# Patient Record
Sex: FEMALE | Race: WHITE | Employment: FULL TIME | URBAN - METROPOLITAN AREA
[De-identification: names, ages, dates, MRNs, and addresses within clinical notes are randomized per-mention and may not be internally consistent; named-entity substitution may affect disease eponyms.]

---

## 2021-07-27 ENCOUNTER — OFFICE VISIT (OUTPATIENT)
Dept: INTERNAL MEDICINE CLINIC | Facility: CLINIC | Age: 26
End: 2021-07-27
Payer: COMMERCIAL

## 2021-07-27 VITALS
TEMPERATURE: 99 F | HEART RATE: 60 BPM | HEIGHT: 61.8 IN | DIASTOLIC BLOOD PRESSURE: 66 MMHG | WEIGHT: 128 LBS | RESPIRATION RATE: 16 BRPM | OXYGEN SATURATION: 99 % | SYSTOLIC BLOOD PRESSURE: 122 MMHG | BODY MASS INDEX: 23.55 KG/M2

## 2021-07-27 DIAGNOSIS — Z01.419 ENCOUNTER FOR GYNECOLOGICAL EXAMINATION (GENERAL) (ROUTINE) WITHOUT ABNORMAL FINDINGS: ICD-10-CM

## 2021-07-27 DIAGNOSIS — Z00.00 ANNUAL PHYSICAL EXAM: Primary | ICD-10-CM

## 2021-07-27 DIAGNOSIS — Z00.00 LABORATORY EXAMINATION ORDERED AS PART OF A ROUTINE GENERAL MEDICAL EXAMINATION: ICD-10-CM

## 2021-07-27 PROCEDURE — 3078F DIAST BP <80 MM HG: CPT | Performed by: NURSE PRACTITIONER

## 2021-07-27 PROCEDURE — 99385 PREV VISIT NEW AGE 18-39: CPT | Performed by: NURSE PRACTITIONER

## 2021-07-27 PROCEDURE — 3008F BODY MASS INDEX DOCD: CPT | Performed by: NURSE PRACTITIONER

## 2021-07-27 PROCEDURE — 3074F SYST BP LT 130 MM HG: CPT | Performed by: NURSE PRACTITIONER

## 2021-07-27 RX ORDER — ALBUTEROL SULFATE 90 UG/1
2 AEROSOL, METERED RESPIRATORY (INHALATION) EVERY 6 HOURS PRN
Qty: 1 EACH | Refills: 5 | Status: SHIPPED | OUTPATIENT
Start: 2021-07-27

## 2021-07-27 RX ORDER — ALBUTEROL SULFATE 90 UG/1
1 AEROSOL, METERED RESPIRATORY (INHALATION)
COMMUNITY
Start: 2020-06-19 | End: 2021-07-27

## 2021-07-27 RX ORDER — NORETHINDRONE ACETATE AND ETHINYL ESTRADIOL AND FERROUS FUMARATE 1MG-20(24)
KIT ORAL
COMMUNITY
End: 2021-09-28

## 2021-07-27 NOTE — PROGRESS NOTES
Wellness Exam    CC: Patient is presenting for a wellness exam    HPI:   Concerns: New to our office.  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 She has asthma which has been bleeding. Musculoskeletal: Negative for arthralgias and gait problem. Skin: Negative for color change and rash. Neurological: Negative for tremors, weakness and numbness. Hematological: Negative for adenopathy. Does not bruise/bleed easily.    Psych reviewed, albuterol refilled     Orders Placed This Encounter      CBC With Differential With Platelet          Standing Status: Future          Standing Expiration Date: 7/27/2022      Comp Metabolic Panel (14)          Standing Status: Future          St

## 2021-09-28 ENCOUNTER — OFFICE VISIT (OUTPATIENT)
Dept: OBGYN CLINIC | Facility: CLINIC | Age: 26
End: 2021-09-28
Payer: COMMERCIAL

## 2021-09-28 VITALS
SYSTOLIC BLOOD PRESSURE: 114 MMHG | HEIGHT: 61.8 IN | BODY MASS INDEX: 23.85 KG/M2 | WEIGHT: 129.63 LBS | DIASTOLIC BLOOD PRESSURE: 76 MMHG

## 2021-09-28 DIAGNOSIS — Z12.4 SCREENING FOR MALIGNANT NEOPLASM OF CERVIX: ICD-10-CM

## 2021-09-28 DIAGNOSIS — Z01.419 WELL WOMAN EXAM WITH ROUTINE GYNECOLOGICAL EXAM: Primary | ICD-10-CM

## 2021-09-28 PROCEDURE — 99395 PREV VISIT EST AGE 18-39: CPT | Performed by: OBSTETRICS & GYNECOLOGY

## 2021-09-28 PROCEDURE — 88175 CYTOPATH C/V AUTO FLUID REDO: CPT | Performed by: OBSTETRICS & GYNECOLOGY

## 2021-09-28 PROCEDURE — 3008F BODY MASS INDEX DOCD: CPT | Performed by: OBSTETRICS & GYNECOLOGY

## 2021-09-28 PROCEDURE — 3074F SYST BP LT 130 MM HG: CPT | Performed by: OBSTETRICS & GYNECOLOGY

## 2021-09-28 PROCEDURE — 3078F DIAST BP <80 MM HG: CPT | Performed by: OBSTETRICS & GYNECOLOGY

## 2021-09-28 RX ORDER — NORETHINDRONE ACETATE AND ETHINYL ESTRADIOL AND FERROUS FUMARATE 1MG-20(24)
1 KIT ORAL DAILY
Qty: 84 TABLET | Refills: 3 | Status: SHIPPED | OUTPATIENT
Start: 2021-09-28

## 2021-09-28 NOTE — PROGRESS NOTES
GYN H&P     2021  3:46 PM    CC: Patient is here for annual exam    HPI: patient is a 27year old  here for her annual exam  She reports she is doing well today. Recently  in September.  They held the wedding at her house in the [facility] abdominal pain and blood in stool. Genitourinary: Negative for urgency, vaginal bleeding and vaginal discharge. All other systems reviewed and are negative.         /76   Ht 61.8\"   Wt 129 lb 9.6 oz (58.8 kg)   LMP 09/13/2021 (Exact Date)   BMI 2 decline in fertility  F/u in 1 year or PRN    Celina Richardson MD

## 2021-10-05 ENCOUNTER — TELEPHONE (OUTPATIENT)
Dept: INTERNAL MEDICINE CLINIC | Facility: CLINIC | Age: 26
End: 2021-10-05

## 2021-10-05 ENCOUNTER — TELEPHONE (OUTPATIENT)
Dept: OBGYN CLINIC | Facility: CLINIC | Age: 26
End: 2021-10-05

## 2021-10-05 NOTE — TELEPHONE ENCOUNTER
Received MANISH request from [address] [place] [address]  Ph. [phone] fax [phone]    Faxed to [facility] Dept at [phone].

## 2021-10-05 NOTE — TELEPHONE ENCOUNTER
Req for [address]  [address]  [address]  Ph: [phone]  Fx: [phone]    All Medical Records - last 5 years  All Providers - [facility] notes, pathology reports,

## 2021-10-28 ENCOUNTER — TELEPHONE (OUTPATIENT)
Dept: INTERNAL MEDICINE CLINIC | Facility: CLINIC | Age: 26
End: 2021-10-28

## 2021-10-28 NOTE — TELEPHONE ENCOUNTER
Desirae from [address] [place] [address] states that [facility] never received the request.       Instructed to fax over again          Req for [address]  [id number]  [place] [address] [facility]  Ph: [phone]  Fx: [phone]

## 2021-11-24 ENCOUNTER — LABORATORY ENCOUNTER (OUTPATIENT)
Dept: LAB | Age: 26
End: 2021-11-24
Attending: NURSE PRACTITIONER
Payer: COMMERCIAL

## 2021-11-24 DIAGNOSIS — Z00.00 LABORATORY EXAMINATION ORDERED AS PART OF A ROUTINE GENERAL MEDICAL EXAMINATION: ICD-10-CM

## 2021-11-24 PROCEDURE — 81001 URINALYSIS AUTO W/SCOPE: CPT | Performed by: NURSE PRACTITIONER

## 2021-11-24 PROCEDURE — 83036 HEMOGLOBIN GLYCOSYLATED A1C: CPT | Performed by: NURSE PRACTITIONER

## 2021-11-24 PROCEDURE — 80061 LIPID PANEL: CPT | Performed by: NURSE PRACTITIONER

## 2021-11-24 PROCEDURE — 80050 GENERAL HEALTH PANEL: CPT | Performed by: NURSE PRACTITIONER

## 2021-11-29 ENCOUNTER — TELEPHONE (OUTPATIENT)
Dept: INTERNAL MEDICINE CLINIC | Facility: CLINIC | Age: 26
End: 2021-11-29

## 2021-11-29 DIAGNOSIS — R80.9 PROTEINURIA, UNSPECIFIED TYPE: ICD-10-CM

## 2021-11-29 DIAGNOSIS — R31.9 HEMATURIA, UNSPECIFIED TYPE: Primary | ICD-10-CM

## 2021-11-29 NOTE — TELEPHONE ENCOUNTER
LMOVM to COB  Orders pended. Discussed with patient results and  recommendations. Understanding was expressed. Orders placed. Patient denies any UTI signs or symptoms at this time.

## 2021-11-29 NOTE — TELEPHONE ENCOUNTER
----- Message from Reyna Mireles MD sent at 11/24/2021  7:20 PM CST -----  UA with proteinuria. Mild renal insuff which I initially felt due to fasting state and mild dehydration.   However since her UA has protein and hematuria I would like her to undergo

## 2022-01-08 PROCEDURE — 12011 RPR F/E/E/N/L/M 2.5 CM/<: CPT

## 2022-01-08 PROCEDURE — 99283 EMERGENCY DEPT VISIT LOW MDM: CPT

## 2022-01-08 PROCEDURE — 99282 EMERGENCY DEPT VISIT SF MDM: CPT

## 2022-01-09 ENCOUNTER — HOSPITAL ENCOUNTER (EMERGENCY)
Facility: HOSPITAL | Age: 27
Discharge: HOME | End: 2022-01-09
Attending: EMERGENCY MEDICINE
Payer: COMMERCIAL

## 2022-01-09 VITALS
HEIGHT: 62 IN | DIASTOLIC BLOOD PRESSURE: 76 MMHG | RESPIRATION RATE: 16 BRPM | OXYGEN SATURATION: 98 % | HEART RATE: 74 BPM | BODY MASS INDEX: 23.55 KG/M2 | WEIGHT: 128 LBS | TEMPERATURE: 98 F | SYSTOLIC BLOOD PRESSURE: 134 MMHG

## 2022-01-09 DIAGNOSIS — S01.81XA FACIAL LACERATION, INITIAL ENCOUNTER: Primary | ICD-10-CM

## 2022-01-09 NOTE — ED INITIAL ASSESSMENT (HPI)
Pt states she slipped on ice, lac to the L eyebrow. Pt denies LOC, denies any other injuries.  Pt denies visual changes

## 2022-01-09 NOTE — ED PROVIDER NOTES
Patient Seen in: [facility] Emergency Department      History   Patient presents with:  Laceration/Abrasion    Stated Complaint: lac to the L eyebrow, slipped on ice, denies any other injuries    Subjective:   HPI    69-year-old female presents wang uvula midline. Scalp is atraumatic. NECK: No midline cervical thoracic or lumbar spine tenderness or step-off  HEART: Regular rate and rhythm, no murmurs. LUNGS: Clear to auscultation bilaterally. No Rales, no rhonchi, no wheezing, no stridor.   ABDOME

## 2022-01-14 ENCOUNTER — OFFICE VISIT (OUTPATIENT)
Dept: INTERNAL MEDICINE CLINIC | Facility: CLINIC | Age: 27
End: 2022-01-14
Payer: COMMERCIAL

## 2022-01-14 VITALS
HEART RATE: 68 BPM | DIASTOLIC BLOOD PRESSURE: 62 MMHG | OXYGEN SATURATION: 100 % | RESPIRATION RATE: 12 BRPM | SYSTOLIC BLOOD PRESSURE: 126 MMHG | WEIGHT: 132 LBS | TEMPERATURE: 98 F | HEIGHT: 62 IN | BODY MASS INDEX: 24.29 KG/M2

## 2022-01-14 DIAGNOSIS — S01.81XD FACIAL LACERATION, SUBSEQUENT ENCOUNTER: Primary | ICD-10-CM

## 2022-01-14 DIAGNOSIS — L23.3 ALLERGIC CONTACT DERMATITIS DUE TO DRUGS IN CONTACT WITH SKIN: ICD-10-CM

## 2022-01-14 DIAGNOSIS — Z48.02 VISIT FOR SUTURE REMOVAL: ICD-10-CM

## 2022-01-14 PROCEDURE — 3078F DIAST BP <80 MM HG: CPT | Performed by: PHYSICIAN ASSISTANT

## 2022-01-14 PROCEDURE — 99213 OFFICE O/P EST LOW 20 MIN: CPT | Performed by: PHYSICIAN ASSISTANT

## 2022-01-14 PROCEDURE — 3008F BODY MASS INDEX DOCD: CPT | Performed by: PHYSICIAN ASSISTANT

## 2022-01-14 PROCEDURE — 3074F SYST BP LT 130 MM HG: CPT | Performed by: PHYSICIAN ASSISTANT

## 2022-01-15 NOTE — PROGRESS NOTES
Subjective:   Patient ID: Nilsa Vega is a 27year old female. Suture Removal  The sutures were placed 7 to 10 days ago. She tried antibiotic ointment use since the wound repair. The treatment provided moderate relief.  Her temperature was unmeasure 24.14 kg/m²    After suture removal: laceration remained well-approximated without bleeding    Assessment & Plan:   Facial laceration, subsequent encounter  (primary encounter diagnosis)  Visit for suture removal  -pt tolerated suture removal well  -instru

## 2022-06-03 ENCOUNTER — HOSPITAL ENCOUNTER (OUTPATIENT)
Dept: ULTRASOUND IMAGING | Age: 27
Discharge: HOME | End: 2022-06-03
Attending: INTERNAL MEDICINE
Payer: COMMERCIAL

## 2022-06-03 DIAGNOSIS — R31.9 HEMATURIA, UNSPECIFIED TYPE: ICD-10-CM

## 2022-06-03 DIAGNOSIS — R80.9 PROTEINURIA, UNSPECIFIED TYPE: ICD-10-CM

## 2022-06-03 PROCEDURE — 76770 US EXAM ABDO BACK WALL COMP: CPT | Performed by: INTERNAL MEDICINE

## 2022-08-10 NOTE — TELEPHONE ENCOUNTER
Last OV: 09/2021  Last refill date: 09/2021  Follow-up: 1 year  Next appt.: none scheduled    PSRs, please help patient schedule an annual appointment and route back to the nurses for a refill.

## 2022-09-08 RX ORDER — NORETHINDRONE ACETATE AND ETHINYL ESTRADIOL AND FERROUS FUMARATE 1MG-20(24)
KIT ORAL
Qty: 84 TABLET | Refills: 0 | OUTPATIENT
Start: 2022-09-08

## 2022-09-08 NOTE — TELEPHONE ENCOUNTER
Pt states she has 2 months of med left but since schedules are not completed for Nov/Dec appt couldn't be made. Pt will be contacted once schedules are open.

## 2022-11-07 RX ORDER — NORETHINDRONE ACETATE AND ETHINYL ESTRADIOL AND FERROUS FUMARATE 1MG-20(24)
KIT ORAL
Qty: 84 TABLET | Refills: 0 | Status: SHIPPED | OUTPATIENT
Start: 2022-11-07

## 2022-11-07 NOTE — TELEPHONE ENCOUNTER
Last OV: 9/28/21 with Dr. Clemencia Avelar for annual  Last refill date: 9/28/21  Follow-up: 1 year  Next appt.: 11/30/22    Refill sent

## 2022-11-30 NOTE — PATIENT INSTRUCTIONS
Start prenatal vitamin with DHA at least 30 days prior to attempting pregnancy    Track your periods regularly    The average \"cycle\" meaning day 1 of your period, until day 1 of your next period, is 28 days. This means most women ovulate around day 14, or 13 days after the start of your period. Try to have sex at least every other day in the 7-10 days around ovulation. You can get over the counter an ovulation predictor kit to help time intercourse. Cystic Fibrosis/SMA Carrier Screening-  Cystic Fibrosis is a genetic disease which causes lung problems in the infant. SMA (spinal muscular atrophy) is a genetic disease that affects the nerve cells of the spinal cord. These genetic defects would need to be passed from both parents to the child. A blood test is performed on the mother, and if her test is positive, then the father should also be tested. These tests can be done at any time in the pregnancy, usually in the first trimester with your initial OB labs. All babies are screened for cystic fibrosis after birth.      Please verify insurance coverage:   Cystic Fibrosis CPT code: 19249     Diagnosis code: Cystic Fibrosis screening- Z13.228   SMA CPT code: 26350  Diagnosis code: Genetic Screening- Z36.8A , or Testing for Genetic Disease Carrier- Z13.71     If you desire this testing, please verify insurance coverage, and then call or message our office for orders    We could also consider testing you for rubella and varicella immunities

## 2023-07-11 NOTE — TELEPHONE ENCOUNTER
Preferred Lab: [facility]  LOV: 01/14/22  Next OV & Reason:  8/11/23 annual phy   Patient was notified to fast 8-10 hours drinking only water/black coffee prior to having labs drawn and may need to submit urine sample. Hemoglobin A1C will be ordered if patient has diabetes or prediabetes.   Lab orders will be placed by end of day:  yes

## 2023-09-07 NOTE — TELEPHONE ENCOUNTER
Last time medication was refilled 7/27/21  Quantity and # of refills 1 each w 5  Last OV 1/14/22  Next OV 9/22/23  Failed protocol.  Sent to KIMBERLY Alaniz for approval.

## 2023-12-27 NOTE — TELEPHONE ENCOUNTER
Last time medication was refilled 09/07/2023  Quantity and # of refills 8.5 g w 0  Last OV 10/25/2023  Next OV   Future Appointments   Date Time Provider Marco Hollis   12/28/2023  1:30 PM Shelia Das MD [facility]   10/30/2024  3:30 PM YUE Cantu [facility] [address]       Passed protocol, Rx sent.

## 2023-12-28 NOTE — PROGRESS NOTES
[facility]  Obstetrics and Gynecology    Subjective:     Renetta Kaba is a 28 year old  who presents for an annual gyn exam. Patient complaints include - none. She and  are planning pregnancy this year.     Patient's last menstrual period was 2023 (exact date).   Menarche: 12 (2023  1:30 PM)  Period Cycle (Days): 28days (2023  1:30 PM)  Period Duration (Days): 4-5 days (2023  1:30 PM)  Period Flow: Moderate (2023  1:30 PM)  Use of Birth Control (if yes, specify type): OCP (2023  1:30 PM)  Date When Birth Control Last Used: currently (2023  1:30 PM)  Hx Prior Abnormal Pap: Yes (2023  1:30 PM)  Pap Date: 22 (aprox 2017:hpv +, pt had biopsy done) (2023  1:30 PM)  Pap Result Notes: wnl (2023  1:30 PM)     Dyspareunia: No.    Difficulty with bowel or bladder function: No.   History of STDs: No.  Smoker: No.  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     Screening:  Pap smear: neg   Mammogram: - - n/a  Colonoscopy: n/a -   DEXA: n/a No recommendations at this time     Review of Systems  Constitutional: Denies fever/chills, weight loss, fatigue, weakness, or sweating  HEENT: Denies headache, hearing loss or tinnitus, ear pain or discharge, nosebleeds, congestion, sore throat  Eye: Denies visual changes, eye pain or discharge or redness  Cardiovascular: Denies chest pain, palpitations  Pulmonary: Denies cough, shortness of breath, wheezing  Breast: denies breast pain or palpable mass, skin changes, nipple discharge  GI: Denies nausea, vomiting, diarrhea, constipation, heartburn, hemachezia  : Denies dysuria, urgency, frequency, hematuria, flank pain  Musculoskeletal: Denies myalgias, pain in neck or back or joints  Skin: Denies rash, itching  Endocrine: Denies easy bruising or bleeding, increased thirst  Neuro: Denies dizziness, paraesthesias, focal weakness, seizures, loss of consciousness  Psych: Denies depression, anxiety,  suicidal ideations, hallucinations, insomnia     Past Medical History   Past Medical History:   Diagnosis Date    Asthma          Past Obstetric History   OB History    Para Term  AB Living   0 0 0 0 0 0   SAB IAB Ectopic Multiple Live Births   0 0 0 0 0       Past Surgical History   History reviewed. No pertinent surgical history.     Family History   Family History   Problem Relation Age of Onset    Other (ttp) Father     Breast Cancer Maternal Grandmother         Social History   Social History     Socioeconomic History    Marital status:    Tobacco Use    Smoking status: Never    Smokeless tobacco: Never   Vaping Use    Vaping Use: Never used   Substance and Sexual Activity    Alcohol use: Yes     Comment: socially    Drug use: Never    Sexual activity: Yes     Partners: Male     Birth control/protection: OCP   Other Topics Concern    Caffeine Concern Yes     Comment: 1 cup coffee    Exercise Yes     Comment: 5-6x a week    Seat Belt Yes        Medications   Current Outpatient Medications on File Prior to Visit   Medication Sig Dispense Refill    ALBUTEROL 108 (90 Base) MCG/ACT Inhalation Aero Soln INHALE 2 PUFFS BY MOUTH INTO THE LUNGS EVERY 6 HOURS AS NEEDED FOR WHEEZING OR SHORTNESS OF BREATH 8.5 g 0    Norethin Ace-Eth Estrad-FE 1-20 MG-MCG(24) Oral Chew Tab Chew 1 tablet by mouth daily. 84 tablet 3     No current facility-administered medications on file prior to visit.       Allergies   Allergies   Allergen Reactions    Cats Claw, Uncaria Tomentosa UNKNOWN    Neosporin [Bacitracin-Polymyxin B] RASH    Seasonal ITCHING     Itchy throat, eyes            Objective:     Vitals:    23 1325   BP: 122/88   Pulse: 89   Weight: 140 lb 8 oz (63.7 kg)   Height: 62\"       Body mass index is 25.7 kg/m².    GEN: AAOx3, NAD, appears well, appears stated age  HEENT: normocephalic, atraumatic, thyroid symmetric without enlargement or nodularity  BREAST: bilaterally symmetric with no palpable  masses, no nipple discharge, no skin changes  CV: RRR  PULM: CTAB  ABD: soft, NT, ND, no rebound or guarding  EXT: no c/c/e or tenderness  NEURO: CN 2-12 grossly intact  PELVIC:   Vulva: NEFG.   Vagina: Estrogenized, physiologic discharge.    Cervix: No lesions or tenderness.     Uterus: anteverted, 6 week size, firm, mobile, nontender.     Adnexa: No masses or tenderness.     Rectal: Anus and perineum are normal.      Assessment:     Renetta Kaba is a 28 year old  seen for well-women gyn exam.    Plan:     -- cervical cancer screening: up to date with pap smear . S/p Gardasil vaccine.  -- breast cancer screening: continue annual CBE, start annual mammograms at 41yo  -- STD screening ordered: No  -- Contraception: just stopped OCP, will start PNV.    -- addressed patient's specific concerns and questions - carrier screening ordered  -- discussed the importance of optimizing her and partner's health prior to conception - seeing PCP for medical conditions, no smoking or drug use, limiting alcohol use, and starting PNV  -- counseled regarding tracking menstrual cycle and timing of ovulation  -- counseled regarding normal fecundity and will discuss need for further evaluation if unable to conceive after 12 months   -- Discussed further preventative care with PCP, staying up to date with screening and vaccinations, and maintaining healthy diet and exercise.      -- Follow up in 1 year for annual exam or sooner as needed    Sabrina العلي MD  [facility]  2023 1:37 PM

## 2024-07-23 NOTE — TELEPHONE ENCOUNTER
Last time medication was refilled 12/27/2023  Last office visit 10/25/2023  Next office visit due/scheduled   Future Appointments   Date Time Provider Department Center   10/30/2024  3:30 PM Renetta Quiroz APRN [facility] [address]     Medication failed protocol.

## 2025-01-03 NOTE — PROGRESS NOTES
Wellness Exam    CC: Patient is presenting for a wellness exam    HPI:   Concerns: entering second trimester of first pregnancy. Has been feeling well, fatigue is improving. Following with gyne at . Asthma has been controlled, has not worsened with pregnancy.     Pertinent Family History:   Family History   Problem Relation Age of Onset    Other (ttp) Father     Breast Cancer Maternal Grandmother         Gyne:     Health Maintenance   Topic Date Due    Pap Smear  11/30/2025            Denies pelvic pain, abnormal discharge or genital lesions.    Last mammogram: No recommendations at this time     Reported Health: Good.  Diet is general, exercise: walking.    Past Medical History:    Asthma (HCC)     History reviewed. No pertinent surgical history.  Social History     Socioeconomic History    Marital status:    Tobacco Use    Smoking status: Never    Smokeless tobacco: Never   Vaping Use    Vaping status: Never Used   Substance and Sexual Activity    Alcohol use: Not Currently     Comment: socially    Drug use: Never    Sexual activity: Yes     Partners: Male     Birth control/protection: OCP   Other Topics Concern    Caffeine Concern Yes     Comment: 1 cup coffee    Exercise Yes     Comment: 5-6x a week    Seat Belt Yes     Social Drivers of Health     Financial Resource Strain: Low Risk  (6/19/2020)    [facility]    Overall Financial Resource Strain (CARDIA)     Difficulty of Paying Living Expenses: Not hard at all   Food Insecurity: Low Risk  (12/10/2024)    Received from [facility]    Food Insecurity     Have there been times that your food ran out, and you didn't have money to get more?: No     Are there times that you worry that this might happen?: No   Transportation Needs: Low Risk  (12/10/2024)    Received from [facility]    Transportation Needs     Do you have trouble getting  transportation to medical appointments?: No     How do you normally get to and from your appointments?: Other     Medications Ordered Prior to Encounter[1]    Review of Systems   Constitutional: Negative for fever, chills. Improving fatigue.   HENT: Negative for hearing loss, congestion, sore throat and neck pain.    Eyes: Negative for pain and visual disturbance.   Respiratory: Negative for cough and shortness of breath.    Cardiovascular: Negative for chest pain and palpitations.   Gastrointestinal: Negative for nausea, vomiting, abdominal pain and diarrhea.   Genitourinary: Negative for urgency, frequency of urination, and abnormal vaginal bleeding.   Musculoskeletal: Negative for arthralgias and gait problem.   Skin: Negative for color change and rash.   Neurological: Negative for tremors, weakness and numbness.   Hematological: Negative for adenopathy. Does not bruise/bleed easily.   Psychiatric/Behavioral: Negative for confusion and agitation. The patient is not nervous/anxious.      /70   Pulse 68   Temp 97 °F (36.1 °C) (Temporal)   Resp 18   Ht 5' 2\" (1.575 m)   Wt 134 lb (60.8 kg)   SpO2 100%   BMI 24.51 kg/m²   Physical Exam   Constitutional: She is oriented to person, place, and time. She appears well-developed. No distress.   Head: Normocephalic and atraumatic.   Eyes: EOM are normal. Pupils are equal, round, and reactive to light. No scleral icterus. Fundoscopic exam: No hemorrhages, A/V nicking, exudates or papilledema.  ENT: TM's clear, nose normal, no oropharyngeal exudates or tonsillar hypertrophy    Neck: Normal range of motion. No thyromegaly present.   Cardiovascular: Normal rate, regular rhythm and normal heart sounds.  No murmur or friction rub heard.  Pulmonary/Chest: Effort normal and breath sounds normal bilaterally. She has no wheezes or rales.   Abdominal: deferred to gyne  Neurological: She is alert and oriented to person, place, and time. DTRs are +2 and symmetric. Cranial  nerves grossly intact.  Skin: Skin is warm. No rash noted. No erythema, pallor or jaundice.   Psychiatric: She has a normal mood and affect and her behavior is normal.     Assessment and Plan:  Renetta Kaba is a 29 year old female here for a wellness exam.  Age appropriate cancer screening, labs, safety, immunizations were discussed with the patient and ordered as follows:      recommend regular cardiovascular and weight bearing exercise as well as a well-rounded diet.    Her 5 year prevention plan includes: annual physical and labs, 30 minutes exercise most days of week and heart healthy diet  Patient/Caregiver Education:  Patient/Caregiver Education: There are no barriers to learning. Medical education done.  Outcome: Patient verbalizes understanding.       Educated by: KIMBERLY Sevilla         [1]   No current outpatient medications on file prior to visit.     No current facility-administered medications on file prior to visit.

## 2025-06-04 NOTE — TELEPHONE ENCOUNTER
Last time medication was refilled 6/3/25. Pt stating she already lost refill.  Last office visit  1/3/25  Next office visit due/scheduled no appt scheduled

## 2025-06-04 NOTE — TELEPHONE ENCOUNTER
Medication requested: albuterol 108 (90 Base) MCG/ACT Inhalation Aero Soln     Is patient requesting 30 or 90 day supply:  90    Pharmacy name/location:  [id number] - [address]  AT [facility] [address], [phone], [phone]     LOV:  01/03/2025    Is the patient due for appointment: no (if so, please schedule)    Additional notes:  Patient lost her meds and she is calling for a new refill

## (undated) NOTE — LETTER
ASTHMA ACTION PLAN for Priti Torres     : 1995     Date: 10/25/2023  Provider:  YUE Worthington  Phone for doctor or clinic: [facility], [address], [place] [address]  [phone]  [phone]    ACT Score: 24      You can use the colors of a traffic light to help learn about your asthma medicines. 1. Green - Go! % of Personal Best Peak Flow Use controller medicine. Breathing is good  No cough or wheeze  Can work and play Medicine How much to take When to take it    You do not take something daily for this. 2. Yellow - Caution. 50-79% Personal Best Peak  Flow. Use reliever medicine to keep an asthma attack from getting bad. Cough  Wheezing  Tight Chest  Wake up at night Medicine How much to take When to take it    Albuterol inhaler,  2 puffs every six hours as needed. Additional instructions         3. Red - Stop! Danger!  <50% Personal Best Peak  Flow. Take these medications until  Get help from a doctor   Medicine not helping  Breathing is hard and fast  Nose opens wide  Can't walk  Ribs show  Can't talk well Medicine How much to take When to take it    GO TO ER  CALL 911  TAKE TWO PUFFS NOW     Additional Instructions If your symptoms do not improve and you cannot contact your doctor, [facility] or call [facility] immediately! [x] Asthma Action Plan reviewed with patient (and caregiver if necessary) and a copy of the plan was given to the patient/caregiver. [] Asthma Action Plan reviewed with patient (and caregiver if necessary) on the phone and mailed copy to patient or submitted via [address]      Signatures:  Provider  YUE Worthington   Patient Caretaker

## (undated) NOTE — MR AVS SNAPSHOT
After Visit Summary   9/28/2021    Sushil Morales   MRN: [id number]           Visit Information     Date & Time  9/28/2021  3:30 PM Provider  Eleni Mcguire MD Department  [facility], [place], [address] [place], [place] [address]Mary  Dept.  Phone  [phone], back pain and cold symptoms? Skip the drive and waiting room and online chat with a doctor face-to-face using your web-cam enabled computer or mobile device wherever you are.  Video Visits cost $50 and can be paid hassle-free using a credit, kari

## (undated) NOTE — LETTER
ASTHMA ACTION PLAN for Roxane Hansen     : 1995     Date: 2021  Provider:  YUE Ni  Phone for doctor or clinic: [facility], [address], [place] [address] [address], [address] [place] [address]  [phone]